# Patient Record
Sex: FEMALE | Race: WHITE
[De-identification: names, ages, dates, MRNs, and addresses within clinical notes are randomized per-mention and may not be internally consistent; named-entity substitution may affect disease eponyms.]

---

## 2020-02-22 ENCOUNTER — HOSPITAL ENCOUNTER (EMERGENCY)
Dept: HOSPITAL 25 - ED | Age: 30
Discharge: HOME | End: 2020-02-22
Payer: COMMERCIAL

## 2020-02-22 VITALS — SYSTOLIC BLOOD PRESSURE: 119 MMHG | DIASTOLIC BLOOD PRESSURE: 66 MMHG
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
## 2020-02-22 DIAGNOSIS — M25.532: Primary | ICD-10-CM
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
## 2020-02-22 PROCEDURE — 99282 EMERGENCY DEPT VISIT SF MDM: CPT

## 2020-02-22 NOTE — ED
Upper Extremity Pain





- HPI Summary


HPI Summary: 





29-year-old right-hand dominant female presents to the emergency department 

today complaining of 4 out of 10 left wrist pain after falling on an 

outstretched hand 1 hour ago while ice skating.  Patient has full range of 

motion and denies snuffbox tenderness.  Patient has 2+ radial pulse and is 

neurovascularly intact.  There is no obvious deformity of the wrist.  No 

ecchymosis, erythema, edema.  Patient otherwise feels well and denies fever, 

chest pain, abdominal pain, pain with urination, rash, nausea, vomiting, 

diarrhea.  Surgical history family history is noncontributory.





- History of Current Complaint


Chief Complaint: EDExtremityUpper


Stated Complaint: WRIST INJURY PER PT


Time Seen by Provider: 02/22/20 22:07


Hx Obtained From: Patient


Mechanism Of Injury: Fall From A Standing Position


Onset/Duration: Started Hours Ago


Timing: Constant


Severity Initially: Mild


Severity Currently: Mild


Pain Location: Wrist


Character: Aching


Aggravating Factor(s): Movement


Associated Signs & Symptoms: Negative: Swelling, Redness, Bruising, Weakness


Related History: Dominant Hand Right





- Allergies/Home Medications


Allergies/Adverse Reactions: 


 Allergies











Allergy/AdvReac Type Severity Reaction Status Date / Time


 


No Known Allergies Allergy   Verified 02/22/20 21:53











Home Medications: 


 Home Medications





NK [No Home Medications Reported]  02/22/20 [History Confirmed 02/22/20]











PMH/Surg Hx/FS Hx/Imm Hx


Infectious Disease History: No


Infectious Disease History: 


   Denies: Traveled Outside the [place] in Last 30 Days





- Social History


Alcohol Use: Occasionally


Substance Use Type: Reports: None


Smoking Status (MU): Never Smoked Tobacco





Review of Systems


Constitutional: Negative


Eyes: Negative


ENT: Negative


Cardiovascular: Negative


Respiratory: Negative


Gastrointestinal: Negative


Genitourinary: Negative


Positive: Arthralgia


Skin: Negative


Neurological/Mental Status: Negative


Psychological: Normal


All Other Systems Reviewed And Are Negative: Yes





Physical Exam





- Summary


Physical Exam Summary: 





Patient is distress.  There is no obvious deformity of the left wrist.  Patient 

has full range of motion at the left wrist.  There is no ecchymosis, erythema, 

edema.  There is no snuffbox tenderness.  Radial pulse 2+ bilaterally with 

brisk capillary refill.


Triage Information Reviewed: Yes


Vital Signs On Initial Exam: 


 Initial Vitals











Temp Pulse Resp BP Pulse Ox


 


 97 F   72   18   119/66   98 


 


 02/22/20 21:54  02/22/20 21:54  02/22/20 21:54  02/22/20 21:54  02/22/20 21:54











Vital Signs Reviewed: Yes


Appearance: Positive: Well-Appearing, No Pain Distress, Well-Nourished


Skin: Positive: Warm, Skin Color Reflects Adequate Perfusion


Eyes: Positive: EOMI, HORACIO


ENT: Positive: Hearing grossly normal


Respiratory/Lung Sounds: Positive: Clear to Auscultation, Breath Sounds Present


Cardiovascular: Positive: RRR, S1, S2


Abdomen Description: Positive: Nontender, Soft


Bowel Sounds: Positive: Present


Musculoskeletal: Positive: Strength/ROM Intact


Neurological: Positive: Sensory/Motor Intact, Alert, Oriented to Person Place, 

Time, Normal Gait, Facial Symmetry, Speech Normal


Psychiatric: Positive: Normal, Affect/Mood Appropriate


AVPU Assessment: Alert





Procedures





- Sedation


Patient Received Moderate/Deep Sedation with Procedure: No





Diagnostics





- Vital Signs


 Vital Signs











  Temp Pulse Resp BP Pulse Ox


 


 02/22/20 21:54  97 F  72  18  119/66  98














- Laboratory


Lab Statement: Any lab studies that have been ordered have been reviewed, and 

results considered in the medical decision making process.





Course/Dx





- Course


Course Of Treatment: Patient was evaluated in the emergency Department for 

wrist pain.  There is no snuffbox tenderness.  There is no obvious deformity.  

Patient was neurovascularly intact with full range of motion.  X-ray was done 

which showed no obvious fracture.  Patient diagnosed with sprain of the left 

wrist.  Patient given thumb spica splint and discharged for outpatient follow-

up.  Patient refused pain medication in the emergency department.





- Diagnoses


Differential Diagnosis/HQI/PQRI: Positive: Arthritis, Fracture (Closed), Strain

, Sprain


Provider Diagnoses: 


 Left wrist pain








Discharge ED





- Sign-Out/Discharge


Documenting (check all that apply): Patient Departure





- Discharge Plan


Condition: Stable


Disposition: HOME


Patient Education Materials:  Wrist Injury (ED)


Referrals: 


No Primary Care Phys,NOPCP [Primary Care Provider] - 


Maria Victoria Cuevas MD [Medical Doctor] - 5 Days


Additional Instructions: 


* Ace wrap area for your comfort and to allow for immobilization during this 

time to prevent re-injury.


* Ibuprofen 600mg three times daily with meals for pain. 


* Follow up with orthopedic physician in 5-7 days.


* If numbness, tingling or decreased sensation develop, you notice color 

changes in your fingers or pain is worsening, come back to ED immediately for re

-evaluation.


* Protect the area. For your comfort level, do not bear weight, pull or push 

until you can injury is somewhat healed. This may involve the need for 

immobilization or crutches for a period of time.


* Rest the involved area, but not too long. You may need to be off your injury 

for some time to allow for healing, however excessive immobilization of joints 

can lead to stiffness and delay healing time. Early mobilization is encouraged 

if it is pain-free.


* Ice. Not directly on the skin. Cover with a towel. Apply ice no more than 30 

minutes at a time


* Compression: You may use and keep an ace wrap bandage over the injury to 

decrease swelling. Again, this should be limited and be taken off periodically 

to encourage early range of motion and mobilization.


* Elevate: Try to elevate the injured area above the heart whenever possible. 














- Billing Disposition and Condition


Condition: STABLE


Disposition: Home